# Patient Record
Sex: FEMALE | Race: WHITE
[De-identification: names, ages, dates, MRNs, and addresses within clinical notes are randomized per-mention and may not be internally consistent; named-entity substitution may affect disease eponyms.]

---

## 2019-01-30 ENCOUNTER — HOSPITAL ENCOUNTER (INPATIENT)
Dept: HOSPITAL 26 - MED | Age: 74
LOS: 3 days | Discharge: LEFT BEFORE DISCHARGE | DRG: 194 | End: 2019-02-02
Attending: GENERAL PRACTICE | Admitting: GENERAL PRACTICE
Payer: MEDICAID

## 2019-01-30 VITALS — WEIGHT: 98 LBS | HEIGHT: 62 IN | BODY MASS INDEX: 18.03 KG/M2

## 2019-01-30 VITALS — SYSTOLIC BLOOD PRESSURE: 134 MMHG | DIASTOLIC BLOOD PRESSURE: 50 MMHG

## 2019-01-30 VITALS — SYSTOLIC BLOOD PRESSURE: 146 MMHG | DIASTOLIC BLOOD PRESSURE: 54 MMHG

## 2019-01-30 VITALS — SYSTOLIC BLOOD PRESSURE: 180 MMHG | DIASTOLIC BLOOD PRESSURE: 84 MMHG

## 2019-01-30 DIAGNOSIS — I16.1: ICD-10-CM

## 2019-01-30 DIAGNOSIS — I11.0: Primary | ICD-10-CM

## 2019-01-30 DIAGNOSIS — Z79.899: ICD-10-CM

## 2019-01-30 DIAGNOSIS — L72.3: ICD-10-CM

## 2019-01-30 DIAGNOSIS — Z87.891: ICD-10-CM

## 2019-01-30 DIAGNOSIS — N32.81: ICD-10-CM

## 2019-01-30 DIAGNOSIS — Z53.21: ICD-10-CM

## 2019-01-30 DIAGNOSIS — E11.65: ICD-10-CM

## 2019-01-30 DIAGNOSIS — Z83.3: ICD-10-CM

## 2019-01-30 DIAGNOSIS — L98.9: ICD-10-CM

## 2019-01-30 DIAGNOSIS — Z79.4: ICD-10-CM

## 2019-01-30 DIAGNOSIS — D64.9: ICD-10-CM

## 2019-01-30 DIAGNOSIS — Z91.19: ICD-10-CM

## 2019-01-30 DIAGNOSIS — Z88.5: ICD-10-CM

## 2019-01-30 DIAGNOSIS — I50.43: ICD-10-CM

## 2019-01-30 LAB
ALBUMIN FLD-MCNC: 3.6 G/DL (ref 3.4–5)
AMYLASE SERPL-CCNC: 131 U/L (ref 25–115)
ANION GAP SERPL CALCULATED.3IONS-SCNC: 9.2 MMOL/L (ref 8–16)
AST SERPL-CCNC: 22 U/L (ref 15–37)
BASOPHILS # BLD AUTO: 0 K/UL (ref 0–0.22)
BASOPHILS NFR BLD AUTO: 0.4 % (ref 0–2)
BILIRUB SERPL-MCNC: 0.4 MG/DL (ref 0–1)
BUN SERPL-MCNC: 19 MG/DL (ref 7–18)
CHLORIDE SERPL-SCNC: 106 MMOL/L (ref 98–107)
CO2 SERPL-SCNC: 29.1 MMOL/L (ref 21–32)
CREAT SERPL-MCNC: 1.3 MG/DL (ref 0.6–1.3)
EOSINOPHIL # BLD AUTO: 0.1 K/UL (ref 0–0.4)
EOSINOPHIL NFR BLD AUTO: 0.6 % (ref 0–4)
ERYTHROCYTE [DISTWIDTH] IN BLOOD BY AUTOMATED COUNT: 14.6 % (ref 11.6–13.7)
GFR SERPL CREATININE-BSD FRML MDRD: (no result) ML/MIN (ref 90–?)
GLUCOSE SERPL-MCNC: 155 MG/DL (ref 74–106)
HCT VFR BLD AUTO: 36.4 % (ref 36–48)
HGB BLD-MCNC: 11.9 G/DL (ref 12–16)
IRON SERPL-MCNC: 15 UG/DL (ref 35–150)
LIPASE SERPL-CCNC: 606 U/L (ref 73–393)
LYMPHOCYTES # BLD AUTO: 1.5 K/UL (ref 2.5–16.5)
LYMPHOCYTES NFR BLD AUTO: 15.6 % (ref 20.5–51.1)
MAGNESIUM SERPL-MCNC: 2 MG/DL (ref 1.8–2.4)
MCH RBC QN AUTO: 29 PG (ref 27–31)
MCHC RBC AUTO-ENTMCNC: 33 G/DL (ref 33–37)
MCV RBC AUTO: 89.8 FL (ref 80–94)
MONOCYTES # BLD AUTO: 0.6 K/UL (ref 0.8–1)
MONOCYTES NFR BLD AUTO: 6.5 % (ref 1.7–9.3)
NEUTROPHILS # BLD AUTO: 7.3 K/UL (ref 1.8–7.7)
NEUTROPHILS NFR BLD AUTO: 76.9 % (ref 42.2–75.2)
PHOSPHATE SERPL-MCNC: 2.6 MG/DL (ref 2.5–4.9)
PLATELET # BLD AUTO: 362 K/UL (ref 140–450)
POTASSIUM SERPL-SCNC: 4.3 MMOL/L (ref 3.5–5.1)
PROTHROMBIN TIME: 9.1 SECS (ref 10.8–13.4)
RBC # BLD AUTO: 4.05 MIL/UL (ref 4.2–5.4)
SODIUM SERPL-SCNC: 140 MMOL/L (ref 136–145)
T4 FREE SERPL-MCNC: 1.07 NG/DL (ref 0.76–1.46)
TIBC SERPL-MCNC: 223 UG/DL (ref 250–450)
TSH SERPL DL<=0.05 MIU/L-ACNC: 0.85 UIU/ML (ref 0.34–3.74)
WBC # BLD AUTO: 9.5 K/UL (ref 4.8–10.8)

## 2019-01-30 RX ADMIN — SODIUM CHLORIDE SCH MLS/HR: 9 INJECTION, SOLUTION INTRAVENOUS at 17:12

## 2019-01-30 RX ADMIN — DOCUSATE SODIUM SCH MG: 100 CAPSULE, LIQUID FILLED ORAL at 21:00

## 2019-01-30 NOTE — NUR
Patient will be admitted to care of DR GRACE . Admited to [facility], [place] .  Will go to 
room 121B. Belongings list completed.  Report to YOVANI AGUILA.

## 2019-01-30 NOTE — NUR
PT REFUSED COLACE. PER PT, SHE DOESN'T NEED IT. EXPLAINED TO PT THE RISK AND BENEFITS. PT 
VERBALIZED UNDERSTANDING BUT STILL REFUSED. PT'S DAUGHTER AT BEDSIDE.

## 2019-01-30 NOTE — NUR
PATIENT PRESENTS TO ED WITH  C/O HYPERTENSION 180/84 AT THIS TIME, WITH SWOLLEN 
FACE AND ARM NUMBNESS. PT STATES HER LEFT ARM IS SORE, FEELS LIKE SOMEONE 
PUNCHED HER ARM.  DENIES N/V/D; SKIN IS PINK/WARM/DRY; AAOX4 WITH EVEN AND 
STEADY GAIT; LUNGS CLEAR BL; HR EVEN AND REGULAR; PT DENIES ANY FEVER, CP, SOB, 
OR COUGH AT THIS TIME; PATIENT STATES PAIN OF 10/10 AT THIS TIME; VSS; PATIENT 
POSITIONED FOR COMFORT; HOB ELEVATED; BEDRAILS UP X2; BED DOWN. ER MD MADE 
AWARE OF PT STATUS.

## 2019-01-30 NOTE — NUR
Report given to pm nurse Devi. Pt asleep, respirations even & nonlabored, FLACC 0. Call 
light within reach.

## 2019-01-30 NOTE — NUR
RECEIVED REPORT FROM DAY SHIFT NURSE. PT SLEEPING BUT EASILY AROUSABLE. NO S/S OF PAIN OR 
SOB. ON ROOM AIR. IV TO RIGHT FA #22G, PATENT AND INTACT. SAFETY PRECAUTION IN PLACE. CALL 
LIGHT WITHIN REACH.

## 2019-01-30 NOTE — NUR
Pt admitted at this time from ER to room 121B. Report received from ER nurse Ky. Pt 
brought in by mandy; able to amb to bed with steady gait. Pt aaox4, medical hx obtained 
from pt. Pt oriented to room & unit; verbalized understanding & able to return demonstrate 
teachings. Right forearm IV intact & asymptomatic. Left hip indurated lesion noted. Call 
light within reach.

## 2019-01-31 VITALS — DIASTOLIC BLOOD PRESSURE: 65 MMHG | SYSTOLIC BLOOD PRESSURE: 159 MMHG

## 2019-01-31 VITALS — DIASTOLIC BLOOD PRESSURE: 61 MMHG | SYSTOLIC BLOOD PRESSURE: 184 MMHG

## 2019-01-31 VITALS — DIASTOLIC BLOOD PRESSURE: 60 MMHG | SYSTOLIC BLOOD PRESSURE: 158 MMHG

## 2019-01-31 VITALS — SYSTOLIC BLOOD PRESSURE: 137 MMHG | DIASTOLIC BLOOD PRESSURE: 74 MMHG

## 2019-01-31 VITALS — SYSTOLIC BLOOD PRESSURE: 130 MMHG | DIASTOLIC BLOOD PRESSURE: 51 MMHG

## 2019-01-31 VITALS — DIASTOLIC BLOOD PRESSURE: 54 MMHG | SYSTOLIC BLOOD PRESSURE: 140 MMHG

## 2019-01-31 VITALS — DIASTOLIC BLOOD PRESSURE: 53 MMHG | SYSTOLIC BLOOD PRESSURE: 150 MMHG

## 2019-01-31 LAB
ANION GAP SERPL CALCULATED.3IONS-SCNC: 8.1 MMOL/L (ref 8–16)
APPEARANCE UR: CLEAR
BASOPHILS # BLD AUTO: 0 K/UL (ref 0–0.22)
BASOPHILS NFR BLD AUTO: 0.5 % (ref 0–2)
BILIRUB UR QL STRIP: NEGATIVE
BUN SERPL-MCNC: 23 MG/DL (ref 7–18)
CHLORIDE SERPL-SCNC: 113 MMOL/L (ref 98–107)
CHOLEST/HDLC SERPL: 3.2 {RATIO} (ref 1–4.5)
CO2 SERPL-SCNC: 25.9 MMOL/L (ref 21–32)
COLOR UR: YELLOW
CREAT SERPL-MCNC: 1.3 MG/DL (ref 0.6–1.3)
EOSINOPHIL # BLD AUTO: 0.1 K/UL (ref 0–0.4)
EOSINOPHIL NFR BLD AUTO: 1.5 % (ref 0–4)
ERYTHROCYTE [DISTWIDTH] IN BLOOD BY AUTOMATED COUNT: 14 % (ref 11.6–13.7)
GFR SERPL CREATININE-BSD FRML MDRD: (no result) ML/MIN (ref 90–?)
GLUCOSE SERPL-MCNC: 82 MG/DL (ref 74–106)
GLUCOSE UR STRIP-MCNC: NEGATIVE MG/DL
HCT VFR BLD AUTO: 33.6 % (ref 36–48)
HDLC SERPL-MCNC: 53 MG/DL (ref 40–60)
HGB BLD-MCNC: 10.7 G/DL (ref 12–16)
HGB UR QL STRIP: (no result)
LDLC SERPL CALC-MCNC: 96 MG/DL (ref 60–100)
LEUKOCYTE ESTERASE UR QL STRIP: NEGATIVE
LYMPHOCYTES # BLD AUTO: 1.3 K/UL (ref 2.5–16.5)
LYMPHOCYTES NFR BLD AUTO: 17 % (ref 20.5–51.1)
MAGNESIUM SERPL-MCNC: 2 MG/DL (ref 1.8–2.4)
MCH RBC QN AUTO: 29 PG (ref 27–31)
MCHC RBC AUTO-ENTMCNC: 32 G/DL (ref 33–37)
MCV RBC AUTO: 90.5 FL (ref 80–94)
MONOCYTES # BLD AUTO: 0.5 K/UL (ref 0.8–1)
MONOCYTES NFR BLD AUTO: 6.5 % (ref 1.7–9.3)
NEUTROPHILS # BLD AUTO: 5.6 K/UL (ref 1.8–7.7)
NEUTROPHILS NFR BLD AUTO: 74.5 % (ref 42.2–75.2)
NITRITE UR QL STRIP: NEGATIVE
PH UR STRIP: 6 [PH] (ref 5–9)
PHOSPHATE SERPL-MCNC: 3 MG/DL (ref 2.5–4.9)
PLATELET # BLD AUTO: 304 K/UL (ref 140–450)
POTASSIUM SERPL-SCNC: 4 MMOL/L (ref 3.5–5.1)
RBC # BLD AUTO: 3.71 MIL/UL (ref 4.2–5.4)
RBC #/AREA URNS HPF: (no result) /HPF (ref 0–5)
SODIUM SERPL-SCNC: 143 MMOL/L (ref 136–145)
TRIGL SERPL-MCNC: 94 MG/DL (ref 30–150)
WBC # BLD AUTO: 7.5 K/UL (ref 4.8–10.8)
WBC,URINE: (no result) /HPF (ref 0–5)

## 2019-01-31 RX ADMIN — SODIUM CHLORIDE SCH MLS/HR: 9 INJECTION, SOLUTION INTRAVENOUS at 17:12

## 2019-01-31 RX ADMIN — Medication SCH MG: at 09:00

## 2019-01-31 RX ADMIN — Medication SCH MG: at 20:47

## 2019-01-31 RX ADMIN — DOCUSATE SODIUM SCH MG: 100 CAPSULE, LIQUID FILLED ORAL at 20:46

## 2019-01-31 RX ADMIN — ACETAMINOPHEN PRN MG: 325 TABLET ORAL at 02:41

## 2019-01-31 RX ADMIN — DOCUSATE SODIUM SCH MG: 100 CAPSULE, LIQUID FILLED ORAL at 09:29

## 2019-01-31 RX ADMIN — OXYBUTYNIN CHLORIDE SCH MG: 5 TABLET ORAL at 20:46

## 2019-01-31 RX ADMIN — OXYBUTYNIN CHLORIDE SCH MG: 5 TABLET ORAL at 09:28

## 2019-01-31 RX ADMIN — ATORVASTATIN CALCIUM SCH MG: 20 TABLET, FILM COATED ORAL at 09:28

## 2019-01-31 RX ADMIN — Medication SCH MG: at 09:28

## 2019-01-31 NOTE — NUR
PT SLEEPING BUT EASILY AROUSABLE. NO S/S OF PAIN. NO S/S OF RESP DISTRESS. CALL LIGHT WITHIN 
REACH.

## 2019-01-31 NOTE — NUR
1/31/19 RD INITIAL ASSESSMENT COMPLETED



PLEASE REFER TO NUTRITION ASSESSMENT UNDER CARE ACTIVITY FOR ESTIMATED NUTRITIONAL NEEDS. 



1. CONTINUE CARDIAC DIET AS TOLERATED 

2. RD TO FOLLOW UP ON ADEQUATE PO INTAKE. IF AVERAGE PO INTAKE < 75%, RECOMMEND HEALTH SHAKE 


3. RD TO FOLLOW-UP 3-5 DAYS, MODERATE RISK 



MISTY [address]

## 2019-01-31 NOTE — NUR
PATIENT HAS BEEN SCREENED AND CATEGORIZED AS HIGH NUTRITION RISK. PATIENT WILL BE SEEN 
WITHIN 1-2 DAYS OF ADMISSION.



01/31/19-02/01/19



MISTY [address]

## 2019-01-31 NOTE — NUR
PT'S /67, HR 68. PT REFUSED HYDRALAZINE 5 MG IVP. EXPLAINED TO PT THE RISK AND 
BENEFITS. PT VERBALIZED UNDERSTANDING BUT STILL REFUSED. DR. GOLDSTEIN MADE AWARE. PER MD, 
RECHECKED BP LATER.

## 2019-01-31 NOTE — NUR
SLEEPING AT THIS TIME. NO RESTLESSNESS AND ON TELEMETRY MONITORING. CALL LIGHT WITH IN 
REACH.  BP AT THIS TIME 137/74.

## 2019-01-31 NOTE — NUR
Pt with incontinent episode of large watery brown stool, normal odor. Pt states she felt 
some abd cramps, then suddenly had to use the restroom. Pt soiled herself in bed, then amb 
to toilet & had more BM there. Sponge bath provided. Pt verbalized relief of abd cramps 
after BM; no c/o discomfort at this time. Call light within reach. Right forearm IV intact & 
asymptomatic.

## 2019-01-31 NOTE — NUR
RECEIVED FROM AM RN IN BED SLEEPING. NO SOB. NO RESTLESSNESS NOTED. BED ALARM ON. DX. OF 
ELEVATED TROPONIN AND HTN. TELEMETRY MONITORING. IVF SITE INTACT AND NO NOTED LEAKING .

## 2019-01-31 NOTE — NUR
RECEIVED CRITICAL LAB VALUE TROPONIN 0.088. DR. GOLDSTEIN MADE AWARE. MD TO SEE PT. NO NEW 
ORDER AT THIS TIME.

## 2019-01-31 NOTE — NUR
Pt sitting up in bed, eating breakfast. No c/o discomfort. Respirations even & nonlabored in 
room air. Call light within reach.

## 2019-01-31 NOTE — NUR
PT REFUSED HEPARIN. EXPLAINED TO PT THE RISK AND BENEFITS. PT VERBALIZED UNDERSTANDING. DR. GOLDSTEIN SPOKE WITH PT. PT STILL REFUSED HEPARIN.

## 2019-01-31 NOTE — NUR
P.O. MEDICATIONS SWALLOWED WELL. NO ASPIRATION S/S NOTED. ABLE TO VERBALIZE NEEDS WELL. A/O 
X 4. ENCOURAGED TO USE CALL LIGHT FOR ANY HELP SHE MAY NEED OR IF IN PAIN. TELEMETRY 
MONITORING. ENCOURAGED TO TURN TO SIDES AND STAY THERE FOR AT LEAST 2 H. PILLOW SUPPORT TO 
PRESSURE AREAS.

## 2019-01-31 NOTE — NUR
Received report from pm nurse Devi. Pt sound asleep, respirations even & nonlabored, 
FLACC 0. Call light within reach. Cardiac monitor recording well.

## 2019-02-01 VITALS — SYSTOLIC BLOOD PRESSURE: 146 MMHG | DIASTOLIC BLOOD PRESSURE: 54 MMHG

## 2019-02-01 VITALS — SYSTOLIC BLOOD PRESSURE: 168 MMHG | DIASTOLIC BLOOD PRESSURE: 54 MMHG

## 2019-02-01 VITALS — SYSTOLIC BLOOD PRESSURE: 214 MMHG | DIASTOLIC BLOOD PRESSURE: 78 MMHG

## 2019-02-01 VITALS — DIASTOLIC BLOOD PRESSURE: 43 MMHG | SYSTOLIC BLOOD PRESSURE: 131 MMHG

## 2019-02-01 VITALS — DIASTOLIC BLOOD PRESSURE: 71 MMHG | SYSTOLIC BLOOD PRESSURE: 187 MMHG

## 2019-02-01 LAB
ANION GAP SERPL CALCULATED.3IONS-SCNC: 8.6 MMOL/L (ref 8–16)
BASOPHILS # BLD AUTO: 0 K/UL (ref 0–0.22)
BASOPHILS NFR BLD AUTO: 0.4 % (ref 0–2)
BUN SERPL-MCNC: 24 MG/DL (ref 7–18)
CHLORIDE SERPL-SCNC: 111 MMOL/L (ref 98–107)
CO2 SERPL-SCNC: 26.7 MMOL/L (ref 21–32)
CREAT SERPL-MCNC: 1.3 MG/DL (ref 0.6–1.3)
EOSINOPHIL # BLD AUTO: 0.1 K/UL (ref 0–0.4)
EOSINOPHIL NFR BLD AUTO: 0.9 % (ref 0–4)
ERYTHROCYTE [DISTWIDTH] IN BLOOD BY AUTOMATED COUNT: 14.3 % (ref 11.6–13.7)
GFR SERPL CREATININE-BSD FRML MDRD: (no result) ML/MIN (ref 90–?)
GLUCOSE SERPL-MCNC: 87 MG/DL (ref 74–106)
HCT VFR BLD AUTO: 32.2 % (ref 36–48)
HGB BLD-MCNC: 10.6 G/DL (ref 12–16)
LYMPHOCYTES # BLD AUTO: 1.5 K/UL (ref 2.5–16.5)
LYMPHOCYTES NFR BLD AUTO: 18.5 % (ref 20.5–51.1)
MCH RBC QN AUTO: 30 PG (ref 27–31)
MCHC RBC AUTO-ENTMCNC: 33 G/DL (ref 33–37)
MCV RBC AUTO: 91.3 FL (ref 80–94)
MONOCYTES # BLD AUTO: 0.6 K/UL (ref 0.8–1)
MONOCYTES NFR BLD AUTO: 7.6 % (ref 1.7–9.3)
NEUTROPHILS # BLD AUTO: 5.7 K/UL (ref 1.8–7.7)
NEUTROPHILS NFR BLD AUTO: 72.6 % (ref 42.2–75.2)
PLATELET # BLD AUTO: 317 K/UL (ref 140–450)
POTASSIUM SERPL-SCNC: 4.3 MMOL/L (ref 3.5–5.1)
RBC # BLD AUTO: 3.53 MIL/UL (ref 4.2–5.4)
SODIUM SERPL-SCNC: 142 MMOL/L (ref 136–145)
WBC # BLD AUTO: 7.9 K/UL (ref 4.8–10.8)

## 2019-02-01 RX ADMIN — SODIUM CHLORIDE SCH MLS/HR: 9 INJECTION, SOLUTION INTRAVENOUS at 15:08

## 2019-02-01 RX ADMIN — ATORVASTATIN CALCIUM SCH MG: 20 TABLET, FILM COATED ORAL at 08:24

## 2019-02-01 RX ADMIN — ACETAMINOPHEN PRN MG: 325 TABLET ORAL at 16:53

## 2019-02-01 RX ADMIN — FERROUS SULFATE TAB 325 MG (65 MG ELEMENTAL FE) SCH MG: 325 (65 FE) TAB at 08:28

## 2019-02-01 RX ADMIN — OXYBUTYNIN CHLORIDE SCH MG: 5 TABLET ORAL at 08:29

## 2019-02-01 RX ADMIN — Medication SCH MG: at 21:28

## 2019-02-01 RX ADMIN — OXYBUTYNIN CHLORIDE SCH MG: 5 TABLET ORAL at 21:00

## 2019-02-01 RX ADMIN — DOCUSATE SODIUM SCH MG: 100 CAPSULE, LIQUID FILLED ORAL at 17:08

## 2019-02-01 RX ADMIN — DOCUSATE SODIUM SCH MG: 100 CAPSULE, LIQUID FILLED ORAL at 08:28

## 2019-02-01 RX ADMIN — Medication SCH MG: at 08:27

## 2019-02-01 NOTE — NUR
PATIENT /78 HR 83. TEMP .7. PATIENT GIVEN TYLENOL PRN FOR FEVER. DOCTOR MADE 
AWARE. WILL RECHECK TEMPERATURE IN 1 HR.

## 2019-02-01 NOTE — NUR
SPOKE WITH PATIENT, RENATO MADISON.   SHE SAID HER PCP I DR. RAJINDER MISM.   SHE REFUSED TO HAVE ME 
CALL TO MAKE A FOLLOW UP APPOINTMENT.   SHE SAID SHE HAS HAD HIM FOR MANY YEARS AND SHE WILL 
CALL AND MAKE A FOLLOW UP APPOINTMENT.

## 2019-02-01 NOTE — NUR
SLEEPING WELL. NO RESTLESSNESS. TURNED TO SIDES WITH ASSIST BY CNAS. ENCOURAGED TO TURN. 
ABLE TO TURN TO SIDES BY HERSELF. KEPT COMFORTABLE. PILLOW SUPPORT TO PRESSURE AREAS.

## 2019-02-01 NOTE — NUR
RECEIVED BEDSIDE REPORT FROM NIGHT SHIFT NURSE. PATIENT AAOX4. PATIENT ON ROOM AIR, WITH NO 
DISTRESS NOTED. SKIN INTACT, EXCEPT FOR L HIP LESION. PATIENT AMBULATORY AND CONTINENT. 
PATIENT ON TELE MONITOR. STANDARD ISO. IV ON R FA 22 G SALINE LOCK. IV CLEAN DRY AND INTACT. 
BED IN LOW POSITION, CALL LIGHT WITHIN REACH. WILL CONTINUE TO MONITOR.

## 2019-02-01 NOTE — NUR
PATIENT REMOVED TELE MONITOR. EDUCATED PATIENT ON NEED TO PUT LEADS BACK ON. PATIENT STILL 
REFUSES TO HAVE THE LEADS ON. WILL CONTINUE TO MONITOR PATIENT.

## 2019-02-01 NOTE — NUR
ENDORSED TO THE NEXT RN FOR CONTINUITY OF CARE. AWAKE AND ALERT. NO SOB. VERBALIZES WELL AND 
WITH GOOD AFFECT. PT. ON TELEMETRY MONITORING.

## 2019-02-01 NOTE — NUR
WILL GIVE REPORT TO NIGHT SHIFT NURSE. PATIENT SLEEPING, NO DISTRESS NOTED ON ROOM AIR. WILL 
CONTINUE TO MONITOR.

## 2019-02-01 NOTE — NUR
RECEIVED REPORT FROM [facility] FOR CONTINUITY OF CARE. PT A/OX4 ON ROOM AIR. PT IS ABLE TO 
MAKE NEEDS KNOWN, ABLE TO FOLLOW COMMANDS. PT AMBULATES WITH STEADY GAIT AND HAS A DRY SCAB 
WITH INDURATION TO LEFT HIP, SEE WOUND ASSESSMENT. PT HAS A 22G IV TO RIGHT FOREARM, 
ASYMPTOMATIC AND INTACT. VITAL SIGNS WITHIN NORMAL LIMITS. PT STABLE, DENIES HAVING ANY 
PAIN, NO SIGNS OF DISTRESS NOTED AT THIS TIME. PT POSITIONED FOR COMFORT. BED IN LOWEST 
POSITION, BED ALARM ON. WILL CONTINUE TO MONITOR.

## 2019-02-01 NOTE — NUR
ASSISTED TO RESTROOM. NOTED ABLE TO AMBULATE WELL BY HERSELF INDEPENDENTLY. REFUSED TO HAVE 
IVF OF 10 ML PER HOUR CONNECTED " I need to be independent moving around and that thing is 
stopping me"  PLACED A CALL TO DAUGHTER RT PT. WANTS HER TO COME BY THIS AM AND BRING PANTS. 
NO COMPLAINTS DONE. PROVIDED WITH DISPOSABLE PANTIES AS REQUESTED.

## 2019-02-01 NOTE — NUR
SLEEPNG WELL THIS SHIFT POST PAIN MEDICATION. WAKES UP WHEN TOUCHED. CALL LIGHT WITH IN 
REACH AT ALL TIMES.

## 2019-02-01 NOTE — NUR
PATIENT REFUSED AMLODIPINE. PATIENT STATED SHE IS SO SCARED HER BP WILL DROP TOO LOW. 
EDUCATED PATIENT ON THE NEED TO TAKE AMLODIPINE. PATIENT STILL REFUSES. DOCTOR MADE AWARE 
AND STATED PATIENT SHOULD TAKE METOPROLOL SCHEDULED AT 2100. WILL CONTINUE TO MONITOR 
PATIENT.

## 2019-02-01 NOTE — NUR
ADMINISTERED VALSARTAN 320 MG NOW. BP PRIOR /139  HR 85. WILL RECHECK BLOOD PRESSURE 
IN 30 MIN-1 HR.

## 2019-02-01 NOTE — NUR
WOUND CARE EVALUATION TO LEFT HIP ;

LEFT HIP DRY SCAB  UNKNOWN ETIOLOGY,2X2CM WITH INDURATION OF 0.5CM TALL, NO ODOR, NO 
DRAINAGE, KYREE WOUND PINK AND IN TACT. PER PT. IT IS  A CHRONIC WOUND AND DOESN'T BOTHER 
HER, USUALLY STAY DRY, AND ONLY ONCE A WHILE IT DRAIN WITH SMALL AMOUNT OD FLUIDS.



RECOMMENDATION:

-CLEANSE LEFT HIP SCAB WITH NS. PAT DRY, APPLY SOAKED BETADINE 2X2 DRESSING AND COVER WITH 
ISLAND DRESSING QD AND PRN IF SOILING.

-KEEP AREA DRY AND CLEAN AT ALL TIMES.

## 2019-02-02 VITALS — DIASTOLIC BLOOD PRESSURE: 51 MMHG | SYSTOLIC BLOOD PRESSURE: 123 MMHG

## 2019-02-02 VITALS — SYSTOLIC BLOOD PRESSURE: 144 MMHG | DIASTOLIC BLOOD PRESSURE: 85 MMHG

## 2019-02-02 VITALS — DIASTOLIC BLOOD PRESSURE: 65 MMHG | SYSTOLIC BLOOD PRESSURE: 177 MMHG

## 2019-02-02 VITALS — SYSTOLIC BLOOD PRESSURE: 214 MMHG | DIASTOLIC BLOOD PRESSURE: 72 MMHG

## 2019-02-02 VITALS — SYSTOLIC BLOOD PRESSURE: 200 MMHG | DIASTOLIC BLOOD PRESSURE: 76 MMHG

## 2019-02-02 VITALS — SYSTOLIC BLOOD PRESSURE: 210 MMHG | DIASTOLIC BLOOD PRESSURE: 73 MMHG

## 2019-02-02 VITALS — DIASTOLIC BLOOD PRESSURE: 55 MMHG | SYSTOLIC BLOOD PRESSURE: 155 MMHG

## 2019-02-02 VITALS — SYSTOLIC BLOOD PRESSURE: 156 MMHG | DIASTOLIC BLOOD PRESSURE: 47 MMHG

## 2019-02-02 RX ADMIN — ACETAMINOPHEN PRN MG: 325 TABLET ORAL at 05:07

## 2019-02-02 RX ADMIN — OXYBUTYNIN CHLORIDE SCH MG: 5 TABLET ORAL at 08:46

## 2019-02-02 RX ADMIN — DOCUSATE SODIUM SCH MG: 100 CAPSULE, LIQUID FILLED ORAL at 08:47

## 2019-02-02 RX ADMIN — Medication SCH MG: at 08:45

## 2019-02-02 RX ADMIN — FERROUS SULFATE TAB 325 MG (65 MG ELEMENTAL FE) SCH MG: 325 (65 FE) TAB at 08:45

## 2019-02-02 RX ADMIN — Medication SCH MG: at 08:47

## 2019-02-02 RX ADMIN — ATORVASTATIN CALCIUM SCH MG: 20 TABLET, FILM COATED ORAL at 08:45

## 2019-02-02 NOTE — NUR
NIFEDIPINE WAS GIVEN PER ORDER. PATIENT WAS ESCORTED OUT BY STAFF, AMBULATORY WITH STEADY 
GAIT. ALL BELONGINGS WERE TAKEN WITH PATIENT AND FAMILY

## 2019-02-02 NOTE — NUR
VITAL SIGNS WITHIN NORMAL LIMITS. PT STABLE, DENIES HAVING ANY PAIN, NO SIGNS OF DISTRESS 
NOTED AT THIS TIME. PT POSITIONED FOR COMFORT. BED IN LOWEST POSITION, BED ALARM ON. WILL 
CONTINUE TO MONITOR.

## 2019-02-02 NOTE — NUR
PATIENT WAS AWAKE, ALERT. RESPIRATION EVEN, UNLABOR ON ROOM AIR. DENIED PAIN, SOB AT THIS 
TIME. NO DISTRESS NOTED. CALL LIGHT WITHIN REACH

## 2019-02-02 NOTE — NUR
DISCHARGE INSTRUCTIONS WERE GIVEN AND EXPLAINED TO PATIENT. PATIENT VERBALIZED 
UNDERSTANDING. IV WAS REMOVED, CATHETER INTACT, NO ACTIVE BLEEDING SEEN. CARDIAC MONITOR WAS 
REMOVED. PATIENT REFUSED FLU AND PNEUMONIA VAC. PATIENT IS STABLE AT THIS TIME

## 2019-02-02 NOTE — NUR
PATIENT REQUESTED TO LEAVE AMA AFTER BEING EXPLAINED THAT PATIENT NEED TO STAY FOR ANOTHER 
DAY TO MONITOR BP, STATED THAT SHE FEELS FINE RIGHT NOW, AND IF THERE IS ANYTHING WRONG, SHE 
WILL COME BACK LATER, IT IS TOO COLD FOR HER TO STAY IN THE HOSPITAL. RISKS OF LEAVING AMA 
WAS EXPLAINED TO PATIENT BY DR. GARZA. PATIENT VERBALIZED UNDERSTANDING THE RISKS AND 
INSISTED ON LEAVING. AMA FORM WAS SIGNED. PATIENT WAS ENCOURAGED TO WAIT UNTIL PRN MED IS 
GIVEN BEFORE LEAVING. PATIENT AGREED TO PLAN. FAMILY IS AT BEDSIDE.

## 2019-02-02 NOTE — NUR
PATIENT WAS AWAKE, ALERT. RESPIRATION EVEN, UNLABOR ON ROOM AIR. SKIN DRY AND WARM. IV 
PATENT AND INTACT. DENIED PAIN, HEADACHE, SOB AT THIS TIME. PLAN OF CARE WAS DISCUSSED WITH 
PATIENT. BED AT LOW POSITION, SIDE RAILS UP. CALL LIGHT WITHIN REACH

## 2019-04-03 ENCOUNTER — HOSPITAL ENCOUNTER (EMERGENCY)
Dept: HOSPITAL 26 - MED | Age: 74
Discharge: HOME | End: 2019-04-03
Payer: MEDICAID

## 2019-04-03 VITALS — HEIGHT: 59 IN | WEIGHT: 98.38 LBS | BODY MASS INDEX: 19.83 KG/M2

## 2019-04-03 VITALS — DIASTOLIC BLOOD PRESSURE: 69 MMHG | SYSTOLIC BLOOD PRESSURE: 174 MMHG

## 2019-04-03 VITALS — DIASTOLIC BLOOD PRESSURE: 110 MMHG | SYSTOLIC BLOOD PRESSURE: 240 MMHG

## 2019-04-03 DIAGNOSIS — Z79.899: ICD-10-CM

## 2019-04-03 DIAGNOSIS — Z88.5: ICD-10-CM

## 2019-04-03 DIAGNOSIS — I10: ICD-10-CM

## 2019-04-03 DIAGNOSIS — K08.89: Primary | ICD-10-CM

## 2019-04-03 PROCEDURE — 96372 THER/PROPH/DIAG INJ SC/IM: CPT

## 2019-04-03 PROCEDURE — 99284 EMERGENCY DEPT VISIT MOD MDM: CPT

## 2019-04-03 NOTE — NUR
Patient discharged with v/s stable. Written and verbal after care instructions 
given and explained. Patient alert, oriented and verbalized understanding of 
instructions. Ambulatory with steady gait. All questions addressed prior to 
discharge. ID band removed. Patient advised to follow up with PMD. Rx 
Amoxicillin, Motrin, and Tramadol of given. Patient educated on indication of 
medication including possible reaction and side effects. Opportunity to ask 
questions provided and answered.

## 2019-04-03 NOTE — NUR
75 YO F BIB SELF AND DAUGHTER PRESENTS TO THE ED C/O LEFT SIDED MOUTH PAIN SHE 
BELIEVES IS CAUSED BY AN ABSCESSED TOOTH. PT STATES THE PAIN IS 10/10, SHARP 
AND RADIATING INTO HER LEFT EAR. 



-- LEFT JAW APPEARS SLIGHTLY SWOLLEN, TENDER TO TOUCH. 



PMH: DENIES

RX: DENIES



PT POSITIONED FOR COMFORT. SIDE RAIL UP X1. HOB ELEVATED. BED IN LOWEST 
POSITION. VSS. NO APPARENT DISTRESS AT THIS TIME.

## 2019-04-03 NOTE — NUR
BP STILL ELEVATED AFTER CATAPRESS: 254/108. PT STATES SHE IS STILL IN SEVERE 
PAIN: 7/10. DR. GUERRERO MADE AWARE. NEW ORDERS RECIEVED.

## 2019-06-05 ENCOUNTER — HOSPITAL ENCOUNTER (EMERGENCY)
Dept: HOSPITAL 26 - MED | Age: 74
Discharge: LEFT BEFORE DISCHARGE | End: 2019-06-05
Payer: MEDICAID

## 2019-06-05 VITALS — DIASTOLIC BLOOD PRESSURE: 103 MMHG | SYSTOLIC BLOOD PRESSURE: 213 MMHG

## 2019-06-05 VITALS — HEIGHT: 61 IN | WEIGHT: 98 LBS | BODY MASS INDEX: 18.5 KG/M2

## 2019-06-05 VITALS — DIASTOLIC BLOOD PRESSURE: 56 MMHG | SYSTOLIC BLOOD PRESSURE: 145 MMHG

## 2019-06-05 DIAGNOSIS — I10: ICD-10-CM

## 2019-06-05 DIAGNOSIS — I21.4: Primary | ICD-10-CM

## 2019-06-05 DIAGNOSIS — Z79.899: ICD-10-CM

## 2019-06-05 DIAGNOSIS — Z88.5: ICD-10-CM

## 2019-06-05 LAB
ALBUMIN FLD-MCNC: 3.7 G/DL (ref 3.4–5)
ANION GAP SERPL CALCULATED.3IONS-SCNC: 12.8 MMOL/L (ref 8–16)
AST SERPL-CCNC: 13 U/L (ref 15–37)
BASOPHILS # BLD AUTO: 0.1 K/UL (ref 0–0.22)
BASOPHILS NFR BLD AUTO: 0.6 % (ref 0–2)
BILIRUB SERPL-MCNC: 0.7 MG/DL (ref 0–1)
BUN SERPL-MCNC: 20 MG/DL (ref 7–18)
CHLORIDE SERPL-SCNC: 104 MMOL/L (ref 98–107)
CO2 SERPL-SCNC: 24.8 MMOL/L (ref 21–32)
CREAT SERPL-MCNC: 1.3 MG/DL (ref 0.6–1.3)
EOSINOPHIL # BLD AUTO: 0 K/UL (ref 0–0.4)
EOSINOPHIL NFR BLD AUTO: 0.1 % (ref 0–4)
ERYTHROCYTE [DISTWIDTH] IN BLOOD BY AUTOMATED COUNT: 13.5 % (ref 11.6–13.7)
GFR SERPL CREATININE-BSD FRML MDRD: (no result) ML/MIN (ref 90–?)
GLUCOSE SERPL-MCNC: 99 MG/DL (ref 74–106)
HCT VFR BLD AUTO: 35.4 % (ref 36–48)
HGB BLD-MCNC: 11.6 G/DL (ref 12–16)
LIPASE SERPL-CCNC: 213 U/L (ref 73–393)
LYMPHOCYTES # BLD AUTO: 1.6 K/UL (ref 2.5–16.5)
LYMPHOCYTES NFR BLD AUTO: 15 % (ref 20.5–51.1)
MAGNESIUM SERPL-MCNC: 1.8 MG/DL (ref 1.8–2.4)
MCH RBC QN AUTO: 30 PG (ref 27–31)
MCHC RBC AUTO-ENTMCNC: 33 G/DL (ref 33–37)
MCV RBC AUTO: 89.9 FL (ref 80–94)
MONOCYTES # BLD AUTO: 0.7 K/UL (ref 0.8–1)
MONOCYTES NFR BLD AUTO: 7 % (ref 1.7–9.3)
NEUTROPHILS # BLD AUTO: 8 K/UL (ref 1.8–7.7)
NEUTROPHILS NFR BLD AUTO: 77.3 % (ref 42.2–75.2)
PHOSPHATE SERPL-MCNC: 3.4 MG/DL (ref 2.5–4.9)
PLATELET # BLD AUTO: 306 K/UL (ref 140–450)
POTASSIUM SERPL-SCNC: 3.6 MMOL/L (ref 3.5–5.1)
PROTHROMBIN TIME: 9 SECS (ref 10.8–13.4)
RBC # BLD AUTO: 3.93 MIL/UL (ref 4.2–5.4)
SODIUM SERPL-SCNC: 138 MMOL/L (ref 136–145)
TSH SERPL DL<=0.05 MIU/L-ACNC: 0.58 UIU/ML (ref 0.34–3.74)
WBC # BLD AUTO: 10.4 K/UL (ref 4.8–10.8)

## 2019-06-05 PROCEDURE — 87040 BLOOD CULTURE FOR BACTERIA: CPT

## 2019-06-05 PROCEDURE — 83735 ASSAY OF MAGNESIUM: CPT

## 2019-06-05 PROCEDURE — 83690 ASSAY OF LIPASE: CPT

## 2019-06-05 PROCEDURE — 99284 EMERGENCY DEPT VISIT MOD MDM: CPT

## 2019-06-05 PROCEDURE — 84436 ASSAY OF TOTAL THYROXINE: CPT

## 2019-06-05 PROCEDURE — 80053 COMPREHEN METABOLIC PANEL: CPT

## 2019-06-05 PROCEDURE — 85610 PROTHROMBIN TIME: CPT

## 2019-06-05 PROCEDURE — 84443 ASSAY THYROID STIM HORMONE: CPT

## 2019-06-05 PROCEDURE — 84100 ASSAY OF PHOSPHORUS: CPT

## 2019-06-05 PROCEDURE — 85025 COMPLETE CBC W/AUTO DIFF WBC: CPT

## 2019-06-05 PROCEDURE — 71045 X-RAY EXAM CHEST 1 VIEW: CPT

## 2019-06-05 PROCEDURE — 96375 TX/PRO/DX INJ NEW DRUG ADDON: CPT

## 2019-06-05 PROCEDURE — 96374 THER/PROPH/DIAG INJ IV PUSH: CPT

## 2019-06-05 PROCEDURE — 81002 URINALYSIS NONAUTO W/O SCOPE: CPT

## 2019-06-05 PROCEDURE — 84484 ASSAY OF TROPONIN QUANT: CPT

## 2019-06-05 PROCEDURE — 85730 THROMBOPLASTIN TIME PARTIAL: CPT

## 2019-06-05 PROCEDURE — 36415 COLL VENOUS BLD VENIPUNCTURE: CPT

## 2019-06-05 PROCEDURE — 87086 URINE CULTURE/COLONY COUNT: CPT

## 2019-06-05 PROCEDURE — 84134 ASSAY OF PREALBUMIN: CPT

## 2019-06-05 PROCEDURE — 83036 HEMOGLOBIN GLYCOSYLATED A1C: CPT

## 2019-06-05 NOTE — NUR
PT LEFT ER UNIT AMBULATORY W/ STEADY GATE DAUGHER WAITING FOR HER IN PARKING 
LOT TO TAKE HER HOME. PT ACTING APPROPRIATLY; AAOX4. IV D/C.

## 2019-06-05 NOTE — NUR
Patient does not wish to proceed with medical care recommended by Dr. Garsia or 
admitting doctor.  Patient given information related to possible complications, 
up to and including death, which could occur as a result of leaving hospital at 
this time.  Patient verbalizes understanding of risks involved leaving against 
medical advice.  Patient has signed AMA form.

## 2019-06-05 NOTE — NUR
PT BIB SELF C/O CHEST DISCOMFORT. PT STATES SHE HAS HIGH BLOOD PRESSURE AND HAS 
BEEN OUT OF MEDICATION FOR 4 DAYS, PT HAD CHEST PAIN THIS MORNING; CHEST PAIN 
HAS SINCE SUBSIDED TO 0/10 AT THIS TIME. PT ACTING APPROPRIATLY, SPEAKING IN 
CLEAR AND COMPLETE SENTENCES; BREATHING EQUAL AND UNLABORED; DENIES BLURRIED 
VISION OR H/A; STATES SOME POSTERIOR NECK DISCOMFORT. PT PLACED IN GOWN, IN BED 
AND ATTACHED TO BEDSIDE CARDIAC MONITOR. ERMD AWARE OF PT STATUS. 



PMH: HTN

## 2019-06-05 NOTE — NUR
BP HAS DECREASED /63 AT THIS TIME. PT ACTING APPROPRIALTY, SPEAKING IN 
CLEAR AND COMPLETE SENTENCES. COMFORT MEASURES PROVIDED. PT STATES 3/10 NECK 
PAIN AT THIS TIME.

## 2019-06-07 LAB — T4 SERPL-MCNC: 8.8 UG/DL (ref 4.5–12)
